# Patient Record
Sex: MALE | URBAN - METROPOLITAN AREA
[De-identification: names, ages, dates, MRNs, and addresses within clinical notes are randomized per-mention and may not be internally consistent; named-entity substitution may affect disease eponyms.]

---

## 2024-07-29 ENCOUNTER — APPOINTMENT (RX ONLY)
Dept: URBAN - METROPOLITAN AREA CLINIC 155 | Facility: CLINIC | Age: 74
Setting detail: DERMATOLOGY
End: 2024-07-29

## 2024-07-29 DIAGNOSIS — Z48.02 ENCOUNTER FOR REMOVAL OF SUTURES: ICD-10-CM

## 2024-07-29 PROCEDURE — 99024 POSTOP FOLLOW-UP VISIT: CPT

## 2024-07-29 PROCEDURE — ? SUTURE REMOVAL (GLOBAL PERIOD)

## 2024-07-29 ASSESSMENT — LOCATION ZONE DERM: LOCATION ZONE: FACE

## 2024-07-29 ASSESSMENT — LOCATION SIMPLE DESCRIPTION DERM: LOCATION SIMPLE: RIGHT CHEEK

## 2024-07-29 ASSESSMENT — LOCATION DETAILED DESCRIPTION DERM: LOCATION DETAILED: RIGHT CENTRAL MALAR CHEEK

## 2024-07-29 NOTE — PROCEDURE: SUTURE REMOVAL (GLOBAL PERIOD)
Detail Level: Zone
Add 14677 Cpt? (Important Note: In 2017 The Use Of 27839 Is Being Tracked By Cms To Determine Future Global Period Reimbursement For Global Periods): yes

## 2024-08-27 ENCOUNTER — APPOINTMENT (RX ONLY)
Dept: URBAN - METROPOLITAN AREA CLINIC 155 | Facility: CLINIC | Age: 74
Setting detail: DERMATOLOGY
End: 2024-08-27

## 2024-08-27 DIAGNOSIS — Z48.02 ENCOUNTER FOR REMOVAL OF SUTURES: ICD-10-CM | Status: STABLE

## 2024-08-27 PROCEDURE — 99024 POSTOP FOLLOW-UP VISIT: CPT

## 2024-08-27 PROCEDURE — ? SUTURE REMOVAL (NO GLOBAL PERIOD)

## 2024-08-27 PROCEDURE — ? COUNSELING

## 2024-08-27 ASSESSMENT — LOCATION ZONE DERM: LOCATION ZONE: FACE

## 2024-08-27 ASSESSMENT — LOCATION DETAILED DESCRIPTION DERM: LOCATION DETAILED: RIGHT CENTRAL MALAR CHEEK

## 2024-08-27 ASSESSMENT — LOCATION SIMPLE DESCRIPTION DERM: LOCATION SIMPLE: RIGHT CHEEK
